# Patient Record
Sex: MALE | Race: WHITE | NOT HISPANIC OR LATINO | Employment: UNEMPLOYED | URBAN - METROPOLITAN AREA
[De-identification: names, ages, dates, MRNs, and addresses within clinical notes are randomized per-mention and may not be internally consistent; named-entity substitution may affect disease eponyms.]

---

## 2017-05-30 ENCOUNTER — AMBULATORY - RIVER FALLS (OUTPATIENT)
Dept: FAMILY MEDICINE | Facility: CLINIC | Age: 32
End: 2017-05-30
Payer: COMMERCIAL

## 2024-11-23 ENCOUNTER — HOSPITAL ENCOUNTER (EMERGENCY)
Facility: CLINIC | Age: 39
Discharge: HOME | End: 2024-11-23
Attending: EMERGENCY MEDICINE | Admitting: EMERGENCY MEDICINE
Payer: MEDICAID

## 2024-11-23 ENCOUNTER — APPOINTMENT (OUTPATIENT)
Dept: RADIOLOGY | Facility: CLINIC | Age: 39
End: 2024-11-23
Attending: EMERGENCY MEDICINE
Payer: MEDICAID

## 2024-11-23 VITALS
HEIGHT: 72 IN | WEIGHT: 198 LBS | SYSTOLIC BLOOD PRESSURE: 145 MMHG | BODY MASS INDEX: 26.82 KG/M2 | HEART RATE: 77 BPM | TEMPERATURE: 98.1 F | OXYGEN SATURATION: 96 % | RESPIRATION RATE: 18 BRPM | DIASTOLIC BLOOD PRESSURE: 75 MMHG

## 2024-11-23 DIAGNOSIS — S66.911A STRAIN OF RIGHT WRIST, INITIAL ENCOUNTER: ICD-10-CM

## 2024-11-23 PROCEDURE — 73110 X-RAY EXAM OF WRIST: CPT | Mod: RT

## 2024-11-23 PROCEDURE — 29125 APPL SHORT ARM SPLINT STATIC: CPT | Mod: RT

## 2024-11-23 PROCEDURE — 250N000013 HC RX MED GY IP 250 OP 250 PS 637: Performed by: EMERGENCY MEDICINE

## 2024-11-23 PROCEDURE — 99284 EMERGENCY DEPT VISIT MOD MDM: CPT

## 2024-11-23 RX ORDER — IBUPROFEN 600 MG/1
600 TABLET, FILM COATED ORAL ONCE
Status: COMPLETED | OUTPATIENT
Start: 2024-11-23 | End: 2024-11-23

## 2024-11-23 RX ADMIN — IBUPROFEN 600 MG: 600 TABLET ORAL at 11:41

## 2024-11-23 ASSESSMENT — COLUMBIA-SUICIDE SEVERITY RATING SCALE - C-SSRS
2. HAVE YOU ACTUALLY HAD ANY THOUGHTS OF KILLING YOURSELF IN THE PAST MONTH?: NO
6. HAVE YOU EVER DONE ANYTHING, STARTED TO DO ANYTHING, OR PREPARED TO DO ANYTHING TO END YOUR LIFE?: NO
1. IN THE PAST MONTH, HAVE YOU WISHED YOU WERE DEAD OR WISHED YOU COULD GO TO SLEEP AND NOT WAKE UP?: NO

## 2024-11-23 ASSESSMENT — ACTIVITIES OF DAILY LIVING (ADL): ADLS_ACUITY_SCORE: 0

## 2024-11-23 NOTE — ED NOTES
45 minute PTA was chopping wood when the Axe slipped from his right hand and twisted his right wrist.  C/O pain and swelling.  Pulses palpable.  CMS intact.

## 2024-11-23 NOTE — ED PROVIDER NOTES
EMERGENCY DEPARTMENT ENCOUNTER      NAME: Logan León  AGE: 39 year old male  YOB: 1985  MRN: [id number]  EVALUATION DATE & TIME: 2024 11:27 AM    PCP: No primary care provider on file.    ED PROVIDER: Hank Goncalves M.D.      Chief Complaint   Patient presents with    Wrist Pain     Right         FINAL IMPRESSION:  1. Strain of right wrist, initial encounter          ED COURSE & MEDICAL DECISION MAKIN:33 AM I met with the patient, obtained history, performed an initial exam, and discussed options and plan for diagnostics and treatment here in the ED.  12:13 PM Rechecked and updated patient on imaging results. Discussed plan for discharge.    39 year old male presents to the Emergency Department for evaluation of right wrist injury.  Patient had what sounds like a hyper flexion injury while chopping wood.  He indicates pain primarily over his left wrist adjacent to the ulna.  No specific scaphoid tenderness noted.  No skin breakdown or extensive lacerations.  X-rays negative for fracture.  Suspect a ligamentous strain in this area.  He was given a splint to use as needed for discomfort.  We discussed rest ice elevation and follow-up with orthopedics for persistent symptoms.  Patient was discharged in stable condition.    At the conclusion of the encounter I discussed the results of all of the tests and the disposition. The questions were answered. The patient or family acknowledged understanding and was agreeable with the care plan.       Medical Decision Making  Obtained supplemental history:Supplemental history obtained?: Documented in chart  Reviewed external records: External records reviewed?: No  Care impacted by chronic illness:Documented in Chart  Did you consider but not order tests?: Work up considered but not performed and documented in chart, if applicable  Did you interpret images independently?: Independent interpretation of ECG and images noted in documentation, when  "applicable.  Consultation discussion with other provider:Did you involve another provider (consultant, , pharmacy, etc.)?: No  Discharge. No recommendations on prescription strength medication(s). See documentation for any additional details.    MIPS: Not Applicable          MEDICATIONS GIVEN IN THE EMERGENCY:  Medications   ibuprofen (ADVIL/MOTRIN) tablet 600 mg (600 mg Oral $Given 11/23/24 2381)       NEW PRESCRIPTIONS STARTED AT TODAY'S ER VISIT  There are no discharge medications for this patient.         =================================================================    HPI    Patient information was obtained from: Patient    Use of : N/A        Logan León is a 39 year old male with no contributory pertinent medical history, who presents to this ED via walk-in for evaluation of right wrist injury and pain.    Patient reports he was chopping wood and notes his right wrist jerked out in a \"weird angle\", and now has pain in his right wrist. States he flexed and pulled out his right wrist at the same time, then he started experiencing wrist pain. He endorses shooting pain starting at his right wrist that radiates down to his right elbow. No other reported complaints or concerns at this time.      REVIEW OF SYSTEMS   All systems reviewed and negative except as noted in HPI.    PAST MEDICAL HISTORY:  History reviewed. No pertinent past medical history.    PAST SURGICAL HISTORY:  No past surgical history on file.        CURRENT MEDICATIONS:    No current facility-administered medications for this encounter.     No current outpatient medications on file.         ALLERGIES:  No Known Allergies    FAMILY HISTORY:  No family history on file.    SOCIAL HISTORY:   Social History     Socioeconomic History    Marital status: Single     Spouse name: None    Number of children: None    Years of education: None    Highest education level: None     Social Drivers of Health      Received from [employer] " [facility], [facility]    Social Connections       VITALS:  BP (!) 145/75   Pulse 77   Temp 98.1  F (36.7  C) (Temporal)   Resp 18   Ht 1.829 m (6')   Wt 89.8 kg (198 lb)   SpO2 96%   BMI 26.85 kg/m      PHYSICAL EXAM    Constitutional: Well developed, Well nourished, NAD.  HENT: Normocephalic, Atraumatic. Neck Supple.  Eyes: EOMI, Conjunctiva normal.  Respiratory: Breathing comfortably on room air. Speaks full sentences easily. Lungs clear to ascultation.  Cardiovascular: Normal heart rate, Regular rhythm. No peripheral edema.  Abdomen: Soft  Musculoskeletal: There is mild soft tissue swelling and tenderness over the ulnar side of the right distal forearm and wrist.  Decreased range of motion at the right wrist secondary to pain.  No visible deformity.  Hand is without any trauma or tenderness.  Able to flex and extend all digits normally.  No specific scaphoid tenderness.  Integument: Warm, Dry.  Neurologic: Alert & awake, Normal motor function, Normal sensory function, No focal deficits noted.   Psychiatric: Cooperative. Affect appropriate.    RADIOLOGY:  Reviewed all pertinent imaging. Please see official radiology report.  XR Wrist Right G/E 3 Views   Final Result   IMPRESSION: Normal joint spaces and alignment. No fracture. If there is snuffbox tenderness and high clinical concern for occult scaphoid fracture, conservative management and followup radiographs in 7-10 days is suggested.             I, Gogo Moran, am serving as a scribe to document services personally performed by Dr. Hank Goncalves, based on my observation and the provider's statements to me. I, Hank Goncalves MD attest that Gogo Moran is acting in a scribe capacity, has observed my performance of the services and has documented them in accordance with my direction.    Hank Goncalves M.D.  Emergency Medicine  [facility] EMERGENCY ROOM  [address]  [facility] [id number]  [phone]  Dept: [phone]       Hank Goncalves MD  11/23/24 7587

## 2024-11-23 NOTE — ED TRIAGE NOTES
Pt presents with injury to R wrist while chopping wood. CMS intact. Pt reports 8/10 pain that radiates to R elbow. ABCs intact.      Triage Assessment (Adult)       Row Name 11/23/24 1130          Triage Assessment    Airway WDL WDL        Respiratory WDL    Respiratory WDL WDL        Skin Circulation/Temperature WDL    Skin Circulation/Temperature WDL WDL        Cardiac WDL    Cardiac WDL WDL        Peripheral/Neurovascular WDL    Peripheral Neurovascular WDL WDL        Cognitive/Neuro/Behavioral WDL    Cognitive/Neuro/Behavioral WDL WDL

## 2024-11-23 NOTE — DISCHARGE INSTRUCTIONS
You were seen in the emergency department for a right wrist injury.  Your x-rays were negative for fracture or significant trauma.  As we discussed we would expect a hyperextension injury with some strain of the muscles and tendons in that area.  You can use the splint as needed for comfort and stop using this is soon as you feel better.  You can keep ice on the area for the next couple of days to help with swelling and keep it elevated at nighttime.  You can use Tylenol 650 mg and ibuprofen 400 mg every 6 hours as needed for pain.  If symptoms are not getting better in the next couple of weeks as expected you can follow-up with orthopedics using the information above.